# Patient Record
Sex: FEMALE | Race: WHITE | NOT HISPANIC OR LATINO | Employment: OTHER | URBAN - METROPOLITAN AREA
[De-identification: names, ages, dates, MRNs, and addresses within clinical notes are randomized per-mention and may not be internally consistent; named-entity substitution may affect disease eponyms.]

---

## 2022-10-18 ENCOUNTER — APPOINTMENT (OUTPATIENT)
Dept: RADIOLOGY | Facility: CLINIC | Age: 87
End: 2022-10-18
Attending: EMERGENCY MEDICINE
Payer: COMMERCIAL

## 2022-10-18 ENCOUNTER — APPOINTMENT (OUTPATIENT)
Dept: CT IMAGING | Facility: CLINIC | Age: 87
End: 2022-10-18
Attending: EMERGENCY MEDICINE
Payer: COMMERCIAL

## 2022-10-18 ENCOUNTER — HOSPITAL ENCOUNTER (EMERGENCY)
Facility: CLINIC | Age: 87
Discharge: ANOTHER HOSPITAL | End: 2022-10-18
Attending: EMERGENCY MEDICINE | Admitting: EMERGENCY MEDICINE
Payer: COMMERCIAL

## 2022-10-18 VITALS
BODY MASS INDEX: 22.19 KG/M2 | TEMPERATURE: 97.7 F | OXYGEN SATURATION: 94 % | HEIGHT: 60 IN | SYSTOLIC BLOOD PRESSURE: 197 MMHG | RESPIRATION RATE: 22 BRPM | WEIGHT: 113 LBS | HEART RATE: 80 BPM | DIASTOLIC BLOOD PRESSURE: 88 MMHG

## 2022-10-18 DIAGNOSIS — U07.1 INFECTION DUE TO 2019 NOVEL CORONAVIRUS: ICD-10-CM

## 2022-10-18 DIAGNOSIS — I95.1 ORTHOSTATIC SYNCOPE: ICD-10-CM

## 2022-10-18 LAB
ALBUMIN UR-MCNC: NEGATIVE MG/DL
ANION GAP SERPL CALCULATED.3IONS-SCNC: 10 MMOL/L (ref 5–18)
APPEARANCE UR: CLEAR
ATRIAL RATE - MUSE: 61 BPM
BACTERIA #/AREA URNS HPF: ABNORMAL /HPF
BASOPHILS # BLD AUTO: 0 10E3/UL (ref 0–0.2)
BASOPHILS NFR BLD AUTO: 0 %
BILIRUB UR QL STRIP: NEGATIVE
BUN SERPL-MCNC: 15 MG/DL (ref 8–28)
CALCIUM SERPL-MCNC: 9 MG/DL (ref 8.5–10.5)
CHLORIDE BLD-SCNC: 103 MMOL/L (ref 98–107)
CO2 SERPL-SCNC: 26 MMOL/L (ref 22–31)
COLOR UR AUTO: ABNORMAL
CREAT SERPL-MCNC: 0.7 MG/DL (ref 0.6–1.1)
DIASTOLIC BLOOD PRESSURE - MUSE: 91 MMHG
EOSINOPHIL # BLD AUTO: 0.1 10E3/UL (ref 0–0.7)
EOSINOPHIL NFR BLD AUTO: 1 %
ERYTHROCYTE [DISTWIDTH] IN BLOOD BY AUTOMATED COUNT: 13.2 % (ref 10–15)
FLUAV RNA SPEC QL NAA+PROBE: NEGATIVE
FLUBV RNA RESP QL NAA+PROBE: NEGATIVE
GFR SERPL CREATININE-BSD FRML MDRD: 77 ML/MIN/1.73M2
GLUCOSE BLD-MCNC: 129 MG/DL (ref 70–125)
GLUCOSE BLDC GLUCOMTR-MCNC: 153 MG/DL (ref 70–99)
GLUCOSE UR STRIP-MCNC: NEGATIVE MG/DL
HCT VFR BLD AUTO: 45 % (ref 35–47)
HGB BLD-MCNC: 14.6 G/DL (ref 11.7–15.7)
HGB UR QL STRIP: NEGATIVE
HOLD SPECIMEN: NORMAL
HOLD SPECIMEN: NORMAL
IMM GRANULOCYTES # BLD: 0.1 10E3/UL
IMM GRANULOCYTES NFR BLD: 1 %
INTERPRETATION ECG - MUSE: NORMAL
KETONES UR STRIP-MCNC: NEGATIVE MG/DL
LACTATE SERPL-SCNC: 2.5 MMOL/L (ref 0.7–2)
LEUKOCYTE ESTERASE UR QL STRIP: ABNORMAL
LYMPHOCYTES # BLD AUTO: 1.9 10E3/UL (ref 0.8–5.3)
LYMPHOCYTES NFR BLD AUTO: 20 %
MAGNESIUM SERPL-MCNC: 2.1 MG/DL (ref 1.8–2.6)
MCH RBC QN AUTO: 28.6 PG (ref 26.5–33)
MCHC RBC AUTO-ENTMCNC: 32.4 G/DL (ref 31.5–36.5)
MCV RBC AUTO: 88 FL (ref 78–100)
MONOCYTES # BLD AUTO: 1.2 10E3/UL (ref 0–1.3)
MONOCYTES NFR BLD AUTO: 13 %
MUCOUS THREADS #/AREA URNS LPF: PRESENT /LPF
NEUTROPHILS # BLD AUTO: 6.2 10E3/UL (ref 1.6–8.3)
NEUTROPHILS NFR BLD AUTO: 65 %
NITRATE UR QL: NEGATIVE
NRBC # BLD AUTO: 0 10E3/UL
NRBC BLD AUTO-RTO: 0 /100
P AXIS - MUSE: 66 DEGREES
PH UR STRIP: 8 [PH] (ref 5–7)
PLATELET # BLD AUTO: 186 10E3/UL (ref 150–450)
POTASSIUM BLD-SCNC: 4 MMOL/L (ref 3.5–5)
PR INTERVAL - MUSE: 142 MS
QRS DURATION - MUSE: 134 MS
QT - MUSE: 484 MS
QTC - MUSE: 487 MS
R AXIS - MUSE: 22 DEGREES
RBC # BLD AUTO: 5.11 10E6/UL (ref 3.8–5.2)
RBC URINE: 0 /HPF
RSV RNA SPEC NAA+PROBE: NEGATIVE
SARS-COV-2 RNA RESP QL NAA+PROBE: POSITIVE
SODIUM SERPL-SCNC: 139 MMOL/L (ref 136–145)
SP GR UR STRIP: 1.03 (ref 1–1.03)
SYSTOLIC BLOOD PRESSURE - MUSE: 207 MMHG
T AXIS - MUSE: 158 DEGREES
TROPONIN I SERPL-MCNC: 0.02 NG/ML (ref 0–0.29)
UROBILINOGEN UR STRIP-MCNC: <2 MG/DL
VENTRICULAR RATE- MUSE: 61 BPM
WBC # BLD AUTO: 9.4 10E3/UL (ref 4–11)
WBC URINE: 6 /HPF

## 2022-10-18 PROCEDURE — 83605 ASSAY OF LACTIC ACID: CPT | Performed by: EMERGENCY MEDICINE

## 2022-10-18 PROCEDURE — 70450 CT HEAD/BRAIN W/O DYE: CPT

## 2022-10-18 PROCEDURE — 250N000011 HC RX IP 250 OP 636: Performed by: EMERGENCY MEDICINE

## 2022-10-18 PROCEDURE — C9803 HOPD COVID-19 SPEC COLLECT: HCPCS

## 2022-10-18 PROCEDURE — 83735 ASSAY OF MAGNESIUM: CPT | Performed by: EMERGENCY MEDICINE

## 2022-10-18 PROCEDURE — 71275 CT ANGIOGRAPHY CHEST: CPT

## 2022-10-18 PROCEDURE — 85025 COMPLETE CBC W/AUTO DIFF WBC: CPT | Performed by: EMERGENCY MEDICINE

## 2022-10-18 PROCEDURE — 84484 ASSAY OF TROPONIN QUANT: CPT | Performed by: EMERGENCY MEDICINE

## 2022-10-18 PROCEDURE — 99285 EMERGENCY DEPT VISIT HI MDM: CPT | Mod: 25

## 2022-10-18 PROCEDURE — 71045 X-RAY EXAM CHEST 1 VIEW: CPT

## 2022-10-18 PROCEDURE — 87086 URINE CULTURE/COLONY COUNT: CPT | Performed by: EMERGENCY MEDICINE

## 2022-10-18 PROCEDURE — 93005 ELECTROCARDIOGRAM TRACING: CPT | Performed by: EMERGENCY MEDICINE

## 2022-10-18 PROCEDURE — 81001 URINALYSIS AUTO W/SCOPE: CPT | Performed by: EMERGENCY MEDICINE

## 2022-10-18 PROCEDURE — 87637 SARSCOV2&INF A&B&RSV AMP PRB: CPT | Performed by: EMERGENCY MEDICINE

## 2022-10-18 PROCEDURE — 80048 BASIC METABOLIC PNL TOTAL CA: CPT | Performed by: EMERGENCY MEDICINE

## 2022-10-18 PROCEDURE — 36415 COLL VENOUS BLD VENIPUNCTURE: CPT | Performed by: EMERGENCY MEDICINE

## 2022-10-18 PROCEDURE — 258N000003 HC RX IP 258 OP 636: Performed by: EMERGENCY MEDICINE

## 2022-10-18 RX ORDER — DEXAMETHASONE SODIUM PHOSPHATE 10 MG/ML
6 INJECTION, SOLUTION INTRAMUSCULAR; INTRAVENOUS ONCE
Status: DISCONTINUED | OUTPATIENT
Start: 2022-10-18 | End: 2022-10-19 | Stop reason: HOSPADM

## 2022-10-18 RX ORDER — IOPAMIDOL 755 MG/ML
100 INJECTION, SOLUTION INTRAVASCULAR ONCE
Status: COMPLETED | OUTPATIENT
Start: 2022-10-18 | End: 2022-10-18

## 2022-10-18 RX ORDER — NIRMATRELVIR AND RITONAVIR 300-100 MG
3 KIT ORAL 2 TIMES DAILY
Status: ON HOLD | COMMUNITY
Start: 2022-10-17 | End: 2022-10-21

## 2022-10-18 RX ORDER — VIT A/VIT C/VIT E/ZINC/COPPER 4296-226
1 CAPSULE ORAL 2 TIMES DAILY
COMMUNITY

## 2022-10-18 RX ORDER — GUAIFENESIN 600 MG/1
600 TABLET, EXTENDED RELEASE ORAL 2 TIMES DAILY PRN
COMMUNITY

## 2022-10-18 RX ADMIN — IOPAMIDOL 100 ML: 755 INJECTION, SOLUTION INTRAVENOUS at 12:46

## 2022-10-18 RX ADMIN — SODIUM CHLORIDE 1000 ML: 9 INJECTION, SOLUTION INTRAVENOUS at 11:23

## 2022-10-18 ASSESSMENT — ACTIVITIES OF DAILY LIVING (ADL)
ADLS_ACUITY_SCORE: 35

## 2022-10-18 NOTE — ED PROVIDER NOTES
EMERGENCY DEPARTMENT ENCOUNTER      NAME: Jennifer Sorensen  AGE: 89 year old female  YOB: 1922  MRN: [id number]  EVALUATION DATE & TIME: 10/18/2022 10:53 AM    PCP: No primary care provider on file.    ED PROVIDER: Srikanth Carrillo M.D.      Chief Complaint   Patient presents with     Syncope            Cardiac Arrest     Covid Concern       FINAL IMPRESSION:  1. Infection due to 2019 novel coronavirus    2. Orthostatic syncope         ED COURSE & MEDICAL DECISION MAKIN year old female presents to the Emergency Department for evaluation of syncope.  Most of the history is obtained from her daughter.  Apparently has been suffering from respiratory symptoms as well as her daughter who is known to be positive for COVID-19.  Had an episode of passing out at home today and was briefly unresponsive and ashen per her report.  Unclear via conflicting EMS reports if she actually got any cardiopulmonary resuscitation.  By time of arrival here she is appearing stable, breathing spontaneously, able to answer simple questions.  She did have a confirmatory COVID-19 test which was positive here.  We were able to wean her to 2 L of nasal cannula oxygen although she does desaturate when off of oxygen entirely.  Chest CT negative for PE but does show some bibasilar opacities consistent with possible COVID-pneumonia.  Head CT negative.  The remainder of her evaluation shows an EKG with left bundle branch block, negative troponin, overall fairly reassuring metabolic profile.  She does have a mild lactic acidosis, she received some IV fluids here, vital signs remained stable besides mild hypoxemia.  Given her hypoxia and syncopal episode, she will need to be admitted for continued management.  [facility] is a critical capacity with extensive ED boarding, reached out to other [place] facilities and patient was excepted at [facility] where I spoke with the CANDICE Sue.  Updated patient's family  member and they were in agreement with the plan for transfer and admission.      10:53 AM I met with the patient, obtained history, performed an initial exam, and discussed options and plan for diagnostics and treatment here in the ED. PPE worn: N95, eye protection, gloves.  11:15 AM Spoke with the patient's daughter over the phone to obtain further history. Discussed patient's code status.  1:49 PM Spoke again with the patient's daughter. Further discussed code status.    At the conclusion of the encounter I discussed the results of all of the tests and the disposition. The questions were answered. The patient or family acknowledged understanding and was agreeable with the care plan.       MEDICATIONS GIVEN IN THE EMERGENCY:  Medications   0.9% sodium chloride BOLUS (1,000 mLs Intravenous New Bag 10/18/22 1123)   iopamidol (ISOVUE-370) solution 100 mL (100 mLs Intravenous Given 10/18/22 1246)       NEW PRESCRIPTIONS STARTED AT TODAY'S ER VISIT  New Prescriptions    No medications on file     =================================================================    HPI  HPI limited secondary to altered mental status and acuity of condition.  Patient information was obtained from: EMS, patient    Use of : N/A    Jennifer Sorensen is a 89 year old female without a pertinent history on file who presents to this ED by EMS for evaluation after a syncopal episode. Patient is reportedly visiting her daughter from [place]. No history on file. Arrives by EMS from a private residence after having a syncopal episode at home. Patient reportedly received a couple chest compressions by EMS which woke her up. Patient is reportedly Covid positive with unknown date of diagnosis.    Patient is very hard of hearing but states that she feels a little short of breath and has had a little chest pain.    Per patient's daughter over the phone, patient reportedly was unable to sleep last night because she felt like she couldn't  breathe and was constantly coughing. Daughter gave the patient Mucinex and Theraflu. Daughter got patient to sleep in the chair because she couldn't lie flat. This morning patient told her daughter she felt like she was going to pass out in the chair. Daughter states that patient was gasping for air and looked blue this morning. She called 911 and by the time EMS arrived patient was breathing better and her color had returned. She had the patient laying on the floor on her side when EMS arrived. Daughter mentions that both her and the patient received their flu shot yesterday. She reports that she also recently tested positive for Covid. She does not state whether or not patient has tested positive for Covid, though patient does present with a prescription for Paxlovid. She reports that patient has hearing loss and a couple of falls in the last couple of years, but otherwise does not have any significant past medical history.    Of note, daughter reports that patient would like to be FULL CODE at this time.    REVIEW OF SYSTEMS   ROS unable to be obtained secondary to altered mental status and acuity of condition.    PAST MEDICAL HISTORY:  History reviewed. No pertinent past medical history.    PAST SURGICAL HISTORY:  History reviewed. No pertinent surgical history.    CURRENT MEDICATIONS:    No current facility-administered medications for this encounter.     Current Outpatient Medications   Medication     guaiFENesin (MUCINEX) 600 MG 12 hr tablet     Multiple Vitamins-Minerals (PRESERVISION AREDS) CAPS     nirmatrelvir and ritonavir (PAXLOVID, 300/100,) therapy pack     Pseudoephedrine-DM-GG-APAP (DAYQUIL LIQUICAPS OR)       ALLERGIES:  No Known Allergies    FAMILY HISTORY:  History reviewed. No pertinent family history.    SOCIAL HISTORY:        VITALS:  BP (!) 152/70   Pulse 70   Temp 96.8  F (36  C) (Rectal)   Resp 21   Ht 1.524 m (5')   Wt 51.3 kg (113 lb)   SpO2 93%   BMI 22.07 kg/m      PHYSICAL EXAM     Constitutional: Thin elderly female patient, laying in bed, no acute distress  HENT: Normocephalic, Atraumatic. Neck Supple.  Eyes: EOMI, Conjunctiva normal.  Respiratory: Mildly tachypneic, initially on nonrebreather oxygen.  Lung sounds are coarse bilaterally.  No severe respiratory distress  Cardiovascular: Normal heart rate, Regular rhythm. No peripheral edema.  Abdomen: Soft, nontender  Musculoskeletal: Good range of motion in all major joints. No major deformities noted.  Integument: Warm, Dry.  Neurologic: Alert & awake, Normal motor function, Normal sensory function, No focal deficits noted.   Psychiatric: Cooperative. Affect appropriate.     LAB:  All pertinent labs reviewed and interpreted.  Labs Ordered and Resulted from Time of ED Arrival to Time of ED Departure   BASIC METABOLIC PANEL - Abnormal       Result Value    Sodium 139      Potassium 4.0      Chloride 103      Carbon Dioxide (CO2) 26      Anion Gap 10      Urea Nitrogen 15      Creatinine 0.70      Calcium 9.0      Glucose 129 (*)     GFR Estimate 77     LACTIC ACID WHOLE BLOOD - Abnormal    Lactic Acid 2.5 (*)    ROUTINE UA WITH MICROSCOPIC REFLEX TO CULTURE - Abnormal    Color Urine Light Yellow      Appearance Urine Clear      Glucose Urine Negative      Bilirubin Urine Negative      Ketones Urine Negative      Specific Gravity Urine 1.028      Blood Urine Negative      pH Urine 8.0 (*)     Protein Albumin Urine Negative      Urobilinogen Urine <2.0      Nitrite Urine Negative      Leukocyte Esterase Urine 250 Lexii/uL (*)     Bacteria Urine Few (*)     Mucus Urine Present (*)     RBC Urine 0      WBC Urine 6 (*)    GLUCOSE BY METER - Abnormal    GLUCOSE BY METER POCT 153 (*)    INFLUENZA A/B & SARS-COV2 PCR MULTIPLEX - Abnormal    Influenza A PCR Negative      Influenza B PCR Negative      RSV PCR Negative      SARS CoV2 PCR Positive (*)    TROPONIN I - Normal    Troponin I 0.02     MAGNESIUM - Normal    Magnesium 2.1     GLUCOSE MONITOR  NURSING POCT   CBC WITH PLATELETS AND DIFFERENTIAL    WBC Count 9.4      RBC Count 5.11      Hemoglobin 14.6      Hematocrit 45.0      MCV 88      MCH 28.6      MCHC 32.4      RDW 13.2      Platelet Count 186      % Neutrophils 65      % Lymphocytes 20      % Monocytes 13      % Eosinophils 1      % Basophils 0      % Immature Granulocytes 1      NRBCs per 100 WBC 0      Absolute Neutrophils 6.2      Absolute Lymphocytes 1.9      Absolute Monocytes 1.2      Absolute Eosinophils 0.1      Absolute Basophils 0.0      Absolute Immature Granulocytes 0.1      Absolute NRBCs 0.0     URINE CULTURE       RADIOLOGY:  Reviewed all pertinent imaging. Please see official radiology report.  CT Chest Pulmonary Embolism w Contrast   Final Result   IMPRESSION:   1.  No acute pulmonary embolism or secondary signs of right heart strain.      2.  Bibasilar linear and patchy airspace opacities, most likely subsegmental atelectasis or scarring. Superimposed pneumonia also possible.      3.  Severe T11 vertebral body compression fracture, age indeterminate.      4.  Incidental 1.8 cm rim-enhancing suprasternal nodule, possibly an exophytic thyroid nodule or lymph node. Suggest further evaluation with ultrasound.      Head CT w/o contrast   Final Result   IMPRESSION:   1.  No CT evidence for acute intracranial process.   2.  Brain atrophy and presumed chronic microvascular ischemic changes as above.      XR Chest Port 1 View   Final Result   IMPRESSION: Heart size magnified in AP projection with normal vascularity. Mild interstitial prominence with small pleural effusions could represent some degree of fluid overload. Deformity of the posterolateral left sixth and seventh ribs could    represent acute fractures if focal pain in this area. No pneumothorax.          EKG:    Performed at: 11:13    Impression: Sinus rhythm. LBBB.    Rate: 61 bpm  Rhythm: sinus  Axis: 66, 22, 158  IN Interval: 142  QRS Interval: 134  QTc Interval: 487  ST  Changes: N/A  Comparison: No previous EKG available for comparison.    I have independently reviewed and interpreted the EKG(s) documented above.        I, Aime Carlson, am serving as a scribe to document services personally performed by Dr. Srikanth Carrillo, based on my observation and the provider's statements to me. I, Srikanth Carrillo MD attest that Aime Carlson is acting in a scribe capacity, has observed my performance of the services and has documented them in accordance with my direction.    Srikanth Carrillo M.D.  Emergency Medicine  [facility] EMERGENCY ROOM  [facility] [address]  [phone]  Dept: [phone]     Srikanth Carrillo MD  10/18/22 8577

## 2022-10-18 NOTE — ED NOTES
Bed: [facility]ED-01  Expected date: 10/18/22  Expected time:   Means of arrival:   Comments:  [facility]

## 2022-10-18 NOTE — ED TRIAGE NOTES
"Pt arrives via EMS for concerns of syncope/cardiac arrest.  Pt had a syncopal episode at home and was incontinent of stool.  Per EMS pt was with her daughter and had a syncopal episode.  EMS states pt did not have a pulse/ashen when they arrived and got chest compressions.  No medications given.  Pt awake, hard of hearing and complains of pain \"all over.\"  Pt has known COVID and unknown date of onset.  Arrives with paxlovid dated today. Pt awake currently and answering some questions.  She does report she is visiting from [place].      "

## 2022-10-18 NOTE — PHARMACY-ADMISSION MEDICATION HISTORY
Pharmacy Note - Admission Medication History    Pertinent Provider Information: med info from daughter, paxlovid box-- pt was prescribed full dose of paxlovid from [facility] virtual visit.  Should be dose adjusted for renal function, if continued     ______________________________________________________________________    Prior To Admission (PTA) med list completed and updated in EMR.       PTA Med List   Medication Sig Note Last Dose     guaiFENesin (MUCINEX) 600 MG 12 hr tablet Take 600 mg by mouth 2 times daily as needed for congestion  Past Week     Multiple Vitamins-Minerals (PRESERVISION AREDS) CAPS Take 1 capsule by mouth 2 times daily  Past Week     nirmatrelvir and ritonavir (PAXLOVID, 300/100,) therapy pack Take 3 tablets by mouth 2 times daily 10/18/2022: Per Rx package 3 tabs BID-- however renal dose should be nirmatrelvir 150 mg and ritonavir 100 mg BID x5 days 10/18/2022 at am  3 tab;  has with     Pseudoephedrine-DM-GG-APAP (DAYQUIL LIQUICAPS OR) Take 1 capsule by mouth 2 times daily as needed  Past Week       Information source(s): Family member  Method of interview communication: phone    Summary of Changes to PTA Med List  New: meds new to this encounter  Discontinued: none  Changed: none    Patient was asked about OTC/herbal products specifically.  PTA med list reflects this.    In the past week, patient estimated taking medication this percent of the time:  greater than 90%.    Allergies were reviewed, assessed, and updated with the patient.      Medications available for use during hospital stay: paxlovid.     The information provided in this note is only as accurate as the sources available at the time of the update(s).    Thank you for the opportunity to participate in the care of this patient.    Nessa Alford [facility]  10/18/2022 3:06 PM

## 2022-10-19 ENCOUNTER — HOSPITAL ENCOUNTER (INPATIENT)
Facility: CLINIC | Age: 87
LOS: 2 days | Discharge: HOME | DRG: 177 | End: 2022-10-21
Attending: INTERNAL MEDICINE | Admitting: INTERNAL MEDICINE
Payer: COMMERCIAL

## 2022-10-19 DIAGNOSIS — J12.82 PNEUMONIA DUE TO COVID-19 VIRUS: Primary | ICD-10-CM

## 2022-10-19 DIAGNOSIS — U07.1 PNEUMONIA DUE TO COVID-19 VIRUS: Primary | ICD-10-CM

## 2022-10-19 DIAGNOSIS — I10 HYPERTENSION, UNSPECIFIED TYPE: ICD-10-CM

## 2022-10-19 LAB
ALBUMIN SERPL BCG-MCNC: 3.7 G/DL (ref 3.5–5.2)
ALP SERPL-CCNC: 100 U/L (ref 35–104)
ALT SERPL W P-5'-P-CCNC: 35 U/L (ref 10–35)
ANION GAP SERPL CALCULATED.3IONS-SCNC: 12 MMOL/L (ref 7–15)
AST SERPL W P-5'-P-CCNC: 45 U/L (ref 10–35)
BASOPHILS # BLD AUTO: 0 10E3/UL (ref 0–0.2)
BASOPHILS NFR BLD AUTO: 0 %
BILIRUB SERPL-MCNC: 0.3 MG/DL
BUN SERPL-MCNC: 9.7 MG/DL (ref 8–23)
CALCIUM SERPL-MCNC: 9 MG/DL (ref 8.2–9.6)
CHLORIDE SERPL-SCNC: 99 MMOL/L (ref 98–107)
CREAT SERPL-MCNC: 0.51 MG/DL (ref 0.51–0.95)
CRP SERPL-MCNC: 29.19 MG/L
D DIMER PPP FEU-MCNC: 1.42 UG/ML FEU (ref 0–0.5)
DEPRECATED HCO3 PLAS-SCNC: 26 MMOL/L (ref 22–29)
EOSINOPHIL # BLD AUTO: 0 10E3/UL (ref 0–0.7)
EOSINOPHIL NFR BLD AUTO: 0 %
ERYTHROCYTE [DISTWIDTH] IN BLOOD BY AUTOMATED COUNT: 13.2 % (ref 10–15)
GFR SERPL CREATININE-BSD FRML MDRD: 83 ML/MIN/1.73M2
GLUCOSE SERPL-MCNC: 141 MG/DL (ref 70–99)
HCT VFR BLD AUTO: 44.3 % (ref 35–47)
HGB BLD-MCNC: 14.4 G/DL (ref 11.7–15.7)
HOLD SPECIMEN: NORMAL
IMM GRANULOCYTES # BLD: 0 10E3/UL
IMM GRANULOCYTES NFR BLD: 0 %
INR PPP: 1.07 (ref 0.85–1.15)
LYMPHOCYTES # BLD AUTO: 0.5 10E3/UL (ref 0.8–5.3)
LYMPHOCYTES NFR BLD AUTO: 7 %
MCH RBC QN AUTO: 28.3 PG (ref 26.5–33)
MCHC RBC AUTO-ENTMCNC: 32.5 G/DL (ref 31.5–36.5)
MCV RBC AUTO: 87 FL (ref 78–100)
MONOCYTES # BLD AUTO: 0.6 10E3/UL (ref 0–1.3)
MONOCYTES NFR BLD AUTO: 9 %
NEUTROPHILS # BLD AUTO: 5.5 10E3/UL (ref 1.6–8.3)
NEUTROPHILS NFR BLD AUTO: 84 %
NRBC # BLD AUTO: 0 10E3/UL
NRBC BLD AUTO-RTO: 0 /100
PLATELET # BLD AUTO: 141 10E3/UL (ref 150–450)
POTASSIUM SERPL-SCNC: 3.9 MMOL/L (ref 3.4–5.3)
PROCALCITONIN SERPL IA-MCNC: 0.05 NG/ML
PROT SERPL-MCNC: 7 G/DL (ref 6.4–8.3)
RBC # BLD AUTO: 5.08 10E6/UL (ref 3.8–5.2)
SODIUM SERPL-SCNC: 137 MMOL/L (ref 136–145)
TROPONIN T SERPL HS-MCNC: 35 NG/L
TROPONIN T SERPL HS-MCNC: 41 NG/L
WBC # BLD AUTO: 6.6 10E3/UL (ref 4–11)

## 2022-10-19 PROCEDURE — 84145 PROCALCITONIN (PCT): CPT | Performed by: HOSPITALIST

## 2022-10-19 PROCEDURE — 36415 COLL VENOUS BLD VENIPUNCTURE: CPT | Performed by: HOSPITALIST

## 2022-10-19 PROCEDURE — 36415 COLL VENOUS BLD VENIPUNCTURE: CPT | Performed by: INTERNAL MEDICINE

## 2022-10-19 PROCEDURE — 85379 FIBRIN DEGRADATION QUANT: CPT | Performed by: INTERNAL MEDICINE

## 2022-10-19 PROCEDURE — 84484 ASSAY OF TROPONIN QUANT: CPT | Performed by: INTERNAL MEDICINE

## 2022-10-19 PROCEDURE — 250N000011 HC RX IP 250 OP 636: Performed by: INTERNAL MEDICINE

## 2022-10-19 PROCEDURE — 85610 PROTHROMBIN TIME: CPT | Performed by: INTERNAL MEDICINE

## 2022-10-19 PROCEDURE — XW033E5 INTRODUCTION OF REMDESIVIR ANTI-INFECTIVE INTO PERIPHERAL VEIN, PERCUTANEOUS APPROACH, NEW TECHNOLOGY GROUP 5: ICD-10-PCS | Performed by: INTERNAL MEDICINE

## 2022-10-19 PROCEDURE — 120N000001 HC R&B MED SURG/OB

## 2022-10-19 PROCEDURE — 99223 1ST HOSP IP/OBS HIGH 75: CPT | Mod: AI | Performed by: INTERNAL MEDICINE

## 2022-10-19 PROCEDURE — 258N000003 HC RX IP 258 OP 636: Performed by: INTERNAL MEDICINE

## 2022-10-19 PROCEDURE — 80053 COMPREHEN METABOLIC PANEL: CPT | Performed by: INTERNAL MEDICINE

## 2022-10-19 PROCEDURE — 86140 C-REACTIVE PROTEIN: CPT | Performed by: HOSPITALIST

## 2022-10-19 PROCEDURE — 85025 COMPLETE CBC W/AUTO DIFF WBC: CPT | Performed by: INTERNAL MEDICINE

## 2022-10-19 RX ORDER — CEFTRIAXONE 1 G/1
1 INJECTION, POWDER, FOR SOLUTION INTRAMUSCULAR; INTRAVENOUS EVERY 24 HOURS
Status: DISCONTINUED | OUTPATIENT
Start: 2022-10-19 | End: 2022-10-19

## 2022-10-19 RX ORDER — ENOXAPARIN SODIUM 100 MG/ML
40 INJECTION SUBCUTANEOUS EVERY 24 HOURS
Status: DISCONTINUED | OUTPATIENT
Start: 2022-10-19 | End: 2022-10-21 | Stop reason: HOSPADM

## 2022-10-19 RX ORDER — ACETAMINOPHEN 325 MG/1
325 TABLET ORAL EVERY 4 HOURS PRN
Status: DISCONTINUED | OUTPATIENT
Start: 2022-10-19 | End: 2022-10-21 | Stop reason: HOSPADM

## 2022-10-19 RX ORDER — IPRATROPIUM BROMIDE AND ALBUTEROL SULFATE 2.5; .5 MG/3ML; MG/3ML
3 SOLUTION RESPIRATORY (INHALATION) EVERY 6 HOURS PRN
Status: DISCONTINUED | OUTPATIENT
Start: 2022-10-19 | End: 2022-10-21 | Stop reason: HOSPADM

## 2022-10-19 RX ORDER — LIDOCAINE 40 MG/G
CREAM TOPICAL
Status: DISCONTINUED | OUTPATIENT
Start: 2022-10-19 | End: 2022-10-21 | Stop reason: HOSPADM

## 2022-10-19 RX ORDER — ONDANSETRON 2 MG/ML
4 INJECTION INTRAMUSCULAR; INTRAVENOUS EVERY 6 HOURS PRN
Status: DISCONTINUED | OUTPATIENT
Start: 2022-10-19 | End: 2022-10-21 | Stop reason: HOSPADM

## 2022-10-19 RX ORDER — HYDRALAZINE HYDROCHLORIDE 20 MG/ML
10 INJECTION INTRAMUSCULAR; INTRAVENOUS EVERY 6 HOURS PRN
Status: DISCONTINUED | OUTPATIENT
Start: 2022-10-19 | End: 2022-10-21 | Stop reason: HOSPADM

## 2022-10-19 RX ADMIN — ENOXAPARIN SODIUM 40 MG: 100 INJECTION SUBCUTANEOUS at 05:17

## 2022-10-19 RX ADMIN — DEXAMETHASONE 6 MG: 2 TABLET ORAL at 02:13

## 2022-10-19 RX ADMIN — CEFTRIAXONE 1 G: 1 INJECTION, POWDER, FOR SOLUTION INTRAMUSCULAR; INTRAVENOUS at 05:11

## 2022-10-19 RX ADMIN — REMDESIVIR 200 MG: 100 INJECTION, POWDER, LYOPHILIZED, FOR SOLUTION INTRAVENOUS at 08:03

## 2022-10-19 RX ADMIN — AZITHROMYCIN 500 MG: 500 INJECTION, POWDER, LYOPHILIZED, FOR SOLUTION INTRAVENOUS at 05:53

## 2022-10-19 RX ADMIN — SODIUM CHLORIDE 50 ML: 9 INJECTION, SOLUTION INTRAVENOUS at 08:59

## 2022-10-19 ASSESSMENT — ACTIVITIES OF DAILY LIVING (ADL)
ADLS_ACUITY_SCORE: 27
ADLS_ACUITY_SCORE: 35
ADLS_ACUITY_SCORE: 27

## 2022-10-19 NOTE — H&P
[facility]    History and Physical - Hospitalist Service       Date of Admission:  10/19/2022    Chief Complaint : Syncope.    History of Present Illness   Ms. Sorensen is a 89 year old  female with no significant past medical history who was transferred to [facility] from [facility] ER following a syncopal episode and cardiac arrest. Please note that the patient is hearing impaired and a poor historian and the majority of the history was obtained from the chart. She lives in [place] and she is in town visiting her daughter. She had previously tested positive for COVID-19 and she has a prescription for paxlovid. According to the records she had a syncopal episode at her daughter's home and she was incontinent of bowel. Her daughter called 911 and she did not have a pulse on EMS arrival. Chest compressions were initiated with ROSC and she was transported to [facility] ER for further evaluation. On arrival to the ER she was hypertensive with a blood pressure of 228/102 and hypoxic with O2 saturations in the 80s and her initial labwork was remarkable for a troponin of 41, glucose of 141, d-dimer of 1.42, and glucose of 141. Her CXR showed mild interstitial prominence with small pleural effusions and CT of the head revealed no acute intracranial abnormality. CTA of the chest demonstrated no acute pulmonary embolism or secondary signs of right heart strain, bibasilar linear and patchy airspace opacities, a severe T11 vertebral body compression fracture, and an incidental 1.8 cm rim-enhancing suprasternal nodule. She complains of a dry cough and intermittent chest pain and she was placed on 4 liters nasal cannula in the ER. She is otherwise in stable condition and she has no other complaints today.    Review of Systems    General: negative for fever, chills, sweats, weakness  Eyes: negative for blurred vision, loss of vision  Ear Nose and Throat: negative for pharyngitis, speech  or swallowing difficulties  Respiratory:  positive for cough  Cardiology:  positive for chest pain  Gastrointestinal: negative for abdominal pain, nausea, vomiting, diarrhea, constipation, hematemesis, melena or hematochezia  Genitourinary: negative for frequency, urgency, dysuria, hematuria   Neurological: negative for focal weakness, paresthesia    Past Medical History    I have reviewed this patient's medical history and updated it with pertinent information if needed.   Past Medical History:   Diagnosis Date     Hearing impairment        Past Surgical History   I have reviewed this patient's surgical history and updated it with pertinent information if needed.  Past Surgical History:   Procedure Laterality Date     TONSILLECTOMY         Social History : She denies any tobacco, alcohol, or illicit drug use.       Family History         Prior to Admission Medications   Prior to Admission Medications   Prescriptions Last Dose Informant Patient Reported? Taking?   Multiple Vitamins-Minerals (PRESERVISION AREDS) CAPS   Yes No   Sig: Take 1 capsule by mouth 2 times daily   Pseudoephedrine-DM-GG-APAP (DAYQUIL LIQUICAPS OR)   Yes No   Sig: Take 1 capsule by mouth 2 times daily as needed   guaiFENesin (MUCINEX) 600 MG 12 hr tablet   Yes No   Sig: Take 600 mg by mouth 2 times daily as needed for congestion   nirmatrelvir and ritonavir (PAXLOVID, 300/100,) therapy pack   Yes No   Sig: Take 3 tablets by mouth 2 times daily      Facility-Administered Medications: None     Allergies   No Known Allergies    Physical Exam   Vital Signs: Temp: 98.1  F (36.7  C) Temp src: Oral BP: (!) 185/88 Pulse: 71   Resp: 20 SpO2: 90 % O2 Device: Nasal cannula Oxygen Delivery: 4 LPM  Weight: 109 lbs 12.63 oz    General: Elderly hearing impaired WF in NAD.  HEENT: NCAT.  Neck: No LAD, TMG, or JVD.  CVS: RRR.  Respiratory: Coarse crackles bilaterally.  Abdomen: Soft, NT, ND, BS+.  Extremities: No c/c/e.   Neuro: Alert and oriented to name, ,  month, and year.  Psych: Pleasant, appropriate.  Skin: No skin tear, rash, or ulcer.    Data     Recent Labs   Lab 10/19/22  0210 10/18/22  1206 10/18/22  1105 10/18/22  1103   WBC 6.6  --  9.4  --    HGB 14.4  --  14.6  --    MCV 87  --  88  --    *  --  186  --    INR 1.07  --   --   --     139  --   --    POTASSIUM 3.9 4.0  --   --    CHLORIDE 99 103  --   --    CO2 26 26  --   --    BUN 9.7 15  --   --    CR 0.51 0.70  --   --    ANIONGAP 12 10  --   --    EDILBERTO 9.0 9.0  --   --    * 129*  --  153*   ALBUMIN 3.7  --   --   --    PROTTOTAL 7.0  --   --   --    BILITOTAL 0.3  --   --   --    ALKPHOS 100  --   --   --    ALT 35  --   --   --    AST 45*  --   --   --          Recent Results (from the past 24 hour(s))   XR Chest Port 1 View    Narrative    EXAM: XR CHEST PORT 1 VIEW  LOCATION: [facility]  DATE/TIME: 10/18/2022 11:28 AM    INDICATION: Syncope, COVID 19  COMPARISON: None.      Impression    IMPRESSION: Heart size magnified in AP projection with normal vascularity. Mild interstitial prominence with small pleural effusions could represent some degree of fluid overload. Deformity of the posterolateral left sixth and seventh ribs could   represent acute fractures if focal pain in this area. No pneumothorax.   Head CT w/o contrast    Narrative    EXAM: CT HEAD W/O CONTRAST  LOCATION: [facility]  DATE/TIME: 10/18/2022 12:52 PM    INDICATION: Syncope, AMS  COMPARISON: None.  TECHNIQUE: Routine CT Head without IV contrast. Multiplanar reformats. Dose reduction techniques were used.    FINDINGS:  INTRACRANIAL CONTENTS: No intracranial hemorrhage, extraaxial collection, or mass effect.  No CT evidence of acute infarct. Moderate presumed chronic small vessel ischemic changes. Mild to moderate generalized volume loss. No hydrocephalus. There is   intracranial atherosclerosis.     VISUALIZED ORBITS/SINUSES/MASTOIDS: Prior bilateral cataract  surgery. Visualized portions of the orbits are otherwise unremarkable. No paranasal sinus mucosal disease. No middle ear or mastoid effusion.    BONES/SOFT TISSUES: No acute abnormality.      Impression    IMPRESSION:  1.  No CT evidence for acute intracranial process.  2.  Brain atrophy and presumed chronic microvascular ischemic changes as above.   CT Chest Pulmonary Embolism w Contrast    Narrative    EXAM: CT CHEST PULMONARY EMBOLISM W CONTRAST  LOCATION: [facility]  DATE/TIME: 10/18/2022 12:53 PM    INDICATION: Presumed COVID, Syncope  COMPARISON: None.  TECHNIQUE: CT chest pulmonary angiogram during arterial phase injection of IV contrast. Multiplanar reformats and MIP reconstructions were performed. Dose reduction techniques were used.   CONTRAST: ISOVUE 370 100mL    FINDINGS:  ANGIOGRAM CHEST: Pulmonary arteries are normal caliber and negative for pulmonary emboli. Thoracic aorta is negative for dissection. No CT evidence of right heart strain.    LUNGS AND PLEURA: Bibasilar linear and patchy airspace opacities. No pleural effusion or pneumothorax.    MEDIASTINUM/AXILLAE: No lymphadenopathy. Normal esophagus. No significant pericardial effusion. 1.8 x 1.2 cm rim-enhancing suprasternal nodule, just below the thyroid (6/55)    CORONARY ARTERY CALCIFICATION: Mild.    UPPER ABDOMEN: Atherosclerotic calcification of the upper abdominal aorta.    MUSCULOSKELETAL: Diffuse osteopenia, multilevel degenerative changes in the spine, and severe compression deformity of T11.      Impression    IMPRESSION:  1.  No acute pulmonary embolism or secondary signs of right heart strain.    2.  Bibasilar linear and patchy airspace opacities, most likely subsegmental atelectasis or scarring. Superimposed pneumonia also possible.    3.  Severe T11 vertebral body compression fracture, age indeterminate.    4.  Incidental 1.8 cm rim-enhancing suprasternal nodule, possibly an exophytic thyroid nodule or lymph  node. Suggest further evaluation with ultrasound.      Assessment/Plan   1. Pneumonia due to COVID-19  -Continue droplet isolation, IV remdesivir, and IV rocephin and azithromycin.    2. Acute respiratory failure with hypoxia  -Continue supplemental oxygen and decadron.    3. Cardiac arrest  -Continue telemetry monitoring.    4. Elevated troponin  -Continue telemetry monitoring. A repeat troponin will be ordered now.    5. Hypertensive urgency  -Continue IV hydralazine as needed.    6. Code status  -DNR.    7. Prophylaxis  -Lovenox for DVT prophylaxis.    8. Disposition  -Anticipate discharge home in the next several days once her respiratory status has improved.     ANDERSON MAO MD  [facility]  Securely message with the Vocera Web Console (learn more here)  Text page via [facility] Paging/Directory      Visit/Communication Style   Virtual (Video) communication was used to evaluate Jennifer.  Jennifer consented to the use of video communication: yes  Video START time: 2:45 am, 10/19/2022  Video STOP time: 3 am, 10/19/2022   Patient's location: [facility]   Provider's location during the visit: [place].  Her exam was performed with the assistance of Jessica Azevedo RN.

## 2022-10-19 NOTE — PROGRESS NOTES
[facility] ADMISSION NOTE    Patient admitted to room 2211 at approximately 0010 via cart from [facility] ED. Patient was accompanied by other :EMS personnell.     Verbal SBAR report received from Alden HIGGINS RN prior to patient arrival.     Patient trasferred to bed via lift sheet & 4 staff. Patient alert and oriented X 1. The patient is not having any pain.  . Admission vital signs: Blood pressure (!) 162/77, pulse 72, temperature 98.5  F (36.9  C), temperature source Oral, resp. rate 20, weight 49.8 kg (109 lb 12.6 oz), SpO2 94 %. Patient was oriented to plan of care, call light, bed controls, tv, telephone, bathroom and visiting hours.     Risk Assessment    The following safety risks were identified during admission: fall. Yellow risk band applied: YES.     Skin Initial Assessment    This writer admitted this patient and completed a full skin assessment and Rolando score in the Adult PCS flowsheet. Appropriate interventions initiated as needed.     Secondary skin check completed by Marizol HERNANDEZ RN.         Education    Patient has a [place] to Observation order: No  Observation education completed and documented: N/A      Jessica Azevedo, RN

## 2022-10-19 NOTE — PROGRESS NOTES
Care Management Note:    CM received referral to assist with discharge planning.     As pt is Covid+ and very [place], so placed call to pt's daughter, Alyssia Looney, [phone].  SW left message with an introduction of self & title and requested a return call.    When Alyssia returns call, SW will complete full pt  Assessment and create safe discharge plan of care.    Caroline Mead, [facility]  Care Transitions   Tele: [phone]

## 2022-10-19 NOTE — PROGRESS NOTES
CLINICAL NUTRITION SERVICES - ASSESSMENT NOTE     Nutrition Prescription    RECOMMENDATIONS FOR MDs/PROVIDERS TO ORDER:  None at this time    Malnutrition Status:    Unable to determine d/t covid-19    Recommendations already ordered by Registered Dietitian (RD):  Please send ensure enlive BID at 10 am and 2 pm snack time    Future/Additional Recommendations:  Monitor patient isolation status to complete NFPA as able  Monitor patient tolerance to supplements  Assess patient intakes, weight trends, labs, and GI/BM     REASON FOR ASSESSMENT  Jennifer Sorensen is a/an 89 year old female assessed by the dietitian for Admission Nutrition Risk Screen for positive    NUTRITION HISTORY  Patient is 89 yr old female with no significant past medical hx. Per MD note patient is poor on hearing and poor historian. Patient [place] in [place] but was in town visiting daughter. Patient admitted today; positive for COVID 19. Per MST criteria patient did not note any decreased appetite but was unsure regarding weight loss. Limited recent weight data in patient chart; unable to assess for weight loss.  D/t patient being covid positive have a noted hearing impairment and poor historian nutritional interview deferred at this time; will follow up as able to determine nutrition hx and NFPA. Patient noted to have increased needs related to acute illness; trial of supplementation ordered. RD to monitor and assess patient intakes. Patient is noted to have NKFA.     CURRENT NUTRITION ORDERS  Diet: Orders Placed This Encounter      Combination Diet Regular Diet Adult      Intake/Tolerance: No recorded intakes in patient chart.    LABS  Labs reviewed    MEDICATIONS  Medications reviewed  Scheduled: Remdesivir, Zithromax, Rocephin  Continuous: None  PRN: None Pertinent    ANTHROPOMETRICS  Height: 0 cm (Data Unavailable)  Most Recent Weight: 49.8 kg (109 lb 12.6 oz)    IBW: 45.5 kg  BMI: Normal BMI  Weight History:   Wt Readings from Last 15  Encounters:   10/19/22 49.8 kg (109 lb 12.6 oz)   10/18/22 51.3 kg (113 lb)   limited weight hx in patient chart; weight from yesterday 10/18 appears to be verbal weight; unable to assess.    Dosing Weight: 49.8 kg-actual BW used    ASSESSED NUTRITION NEEDS  Estimated Energy Needs: 1,245-1,494 kcals/day (25 - 30 kcals/kg)  Justification: Maintenance  Estimated Protein Needs: 59-75 grams protein/day (1.2 - 1.5 grams of pro/kg)  Justification: Increased needs  Estimated Fluid Needs: 1,245-1,494 mL/day (1 mL/kcal)   Justification: Per provider pending fluid status    PHYSICAL FINDINGS  See malnutrition section below.    MALNUTRITION  % Intake: No decreased intake noted  % Weight Loss: Unable to assess  Subcutaneous Fat Loss: Unable to assess  Muscle Loss: Unable to assess  Fluid Accumulation/Edema: None noted  Malnutrition Diagnosis: Unable to determine due to lack of NFPA    NUTRITION DIAGNOSIS  Predicted inadequate protein intakes related to increased needs as evidenced by Covid-19 status and age.     INTERVENTIONS  Implementation  Collaboration with other providers-IDT rounds     Goals  Patient to consume % of nutritionally adequate meal trays TID, or the equivalent with supplements/snacks.     Monitoring/Evaluation  Progress toward goals will be monitored and evaluated per protocol.    Ysabel Uriarte RDN, ELIANE  Clinical Dietitian  Office: [phone]  Weekend pager: [phone]

## 2022-10-19 NOTE — PROGRESS NOTES
Pt has been very sleepy since just before lunch. Was awake for breakfast and was alert and orient. Ate fair amount at breakfast. Had a noted bradycardia when she was asleep, otherwise pulse has been in the mid 50s most of shift. Update Son troy and daughter Alyssia. They r hoping she can discharge back to them tomorrow. Arriving after midnight is likely cause of drowsiness. Daughter states that pt does nap during the day. Oxygen has been titrated down to 1.5 liter and she remain satting in low 90s.. Has no other new concerns other than her desire to go home.

## 2022-10-19 NOTE — PROGRESS NOTES
Patient arrived direct admit from [facility] with EMT present and brief report obtained from them. Patient assisted/lift into bed with 4 assist. Pure Wick placement done, brief on. Skin inspected with this writer and assigned RN. Bed alarm armed. Bed in low position. Belongings bag x 2 in room. Paperwork brought to nurses station. Fall precautions. RN awaiting orders.

## 2022-10-19 NOTE — PROGRESS NOTES
\[facility]\"" HEALTH SERVICES  [facility] - Med/Surg    Referral Source: Patient or family request on admission    - Because Jennifer has a positive COVID infection I won't do an in-person visit.  Normally I try to provide a phone visit but she is also hearing impaired.  I will check back tomorrow to see if there are other options for a visit or if family is present.     Plan:  will remain available for any ongoing support needs during LOS.    Yoav Samson M.A., [facility]  Lead   [facility]  Office: [phone]

## 2022-10-20 LAB
ANION GAP SERPL CALCULATED.3IONS-SCNC: 13 MMOL/L (ref 7–15)
BACTERIA UR CULT: NORMAL
BUN SERPL-MCNC: 22.3 MG/DL (ref 8–23)
CALCIUM SERPL-MCNC: 8.9 MG/DL (ref 8.2–9.6)
CHLORIDE SERPL-SCNC: 101 MMOL/L (ref 98–107)
CREAT SERPL-MCNC: 0.57 MG/DL (ref 0.51–0.95)
CRP SERPL-MCNC: 18.28 MG/L
D DIMER PPP FEU-MCNC: 1.06 UG/ML FEU (ref 0–0.5)
DEPRECATED HCO3 PLAS-SCNC: 25 MMOL/L (ref 22–29)
ERYTHROCYTE [DISTWIDTH] IN BLOOD BY AUTOMATED COUNT: 13.2 % (ref 10–15)
GFR SERPL CREATININE-BSD FRML MDRD: 81 ML/MIN/1.73M2
GLUCOSE SERPL-MCNC: 134 MG/DL (ref 70–99)
HCT VFR BLD AUTO: 43.8 % (ref 35–47)
HGB BLD-MCNC: 14.5 G/DL (ref 11.7–15.7)
MCH RBC QN AUTO: 28.5 PG (ref 26.5–33)
MCHC RBC AUTO-ENTMCNC: 33.1 G/DL (ref 31.5–36.5)
MCV RBC AUTO: 86 FL (ref 78–100)
PLATELET # BLD AUTO: 164 10E3/UL (ref 150–450)
POTASSIUM SERPL-SCNC: 3.5 MMOL/L (ref 3.4–5.3)
RBC # BLD AUTO: 5.09 10E6/UL (ref 3.8–5.2)
SODIUM SERPL-SCNC: 139 MMOL/L (ref 136–145)
WBC # BLD AUTO: 9.4 10E3/UL (ref 4–11)

## 2022-10-20 PROCEDURE — 85379 FIBRIN DEGRADATION QUANT: CPT | Performed by: INTERNAL MEDICINE

## 2022-10-20 PROCEDURE — 86140 C-REACTIVE PROTEIN: CPT | Performed by: INTERNAL MEDICINE

## 2022-10-20 PROCEDURE — 250N000011 HC RX IP 250 OP 636: Performed by: INTERNAL MEDICINE

## 2022-10-20 PROCEDURE — 99233 SBSQ HOSP IP/OBS HIGH 50: CPT | Performed by: HOSPITALIST

## 2022-10-20 PROCEDURE — 120N000001 HC R&B MED SURG/OB

## 2022-10-20 PROCEDURE — 258N000003 HC RX IP 258 OP 636: Performed by: INTERNAL MEDICINE

## 2022-10-20 PROCEDURE — 85027 COMPLETE CBC AUTOMATED: CPT | Performed by: INTERNAL MEDICINE

## 2022-10-20 PROCEDURE — 80048 BASIC METABOLIC PNL TOTAL CA: CPT | Performed by: INTERNAL MEDICINE

## 2022-10-20 PROCEDURE — 250N000013 HC RX MED GY IP 250 OP 250 PS 637: Performed by: INTERNAL MEDICINE

## 2022-10-20 PROCEDURE — 250N000013 HC RX MED GY IP 250 OP 250 PS 637: Performed by: HOSPITALIST

## 2022-10-20 PROCEDURE — 36415 COLL VENOUS BLD VENIPUNCTURE: CPT | Performed by: INTERNAL MEDICINE

## 2022-10-20 RX ORDER — METOPROLOL TARTRATE 25 MG/1
25 TABLET, FILM COATED ORAL 2 TIMES DAILY
Status: DISCONTINUED | OUTPATIENT
Start: 2022-10-20 | End: 2022-10-21 | Stop reason: HOSPADM

## 2022-10-20 RX ADMIN — SODIUM CHLORIDE 50 ML: 9 INJECTION, SOLUTION INTRAVENOUS at 06:23

## 2022-10-20 RX ADMIN — ACETAMINOPHEN 325 MG: 325 TABLET, FILM COATED ORAL at 17:36

## 2022-10-20 RX ADMIN — ENOXAPARIN SODIUM 40 MG: 100 INJECTION SUBCUTANEOUS at 04:50

## 2022-10-20 RX ADMIN — REMDESIVIR 100 MG: 100 INJECTION, POWDER, LYOPHILIZED, FOR SOLUTION INTRAVENOUS at 06:22

## 2022-10-20 RX ADMIN — DEXAMETHASONE 6 MG: 2 TABLET ORAL at 01:01

## 2022-10-20 RX ADMIN — AZITHROMYCIN 500 MG: 500 INJECTION, POWDER, LYOPHILIZED, FOR SOLUTION INTRAVENOUS at 04:50

## 2022-10-20 RX ADMIN — METOPROLOL TARTRATE 25 MG: 25 TABLET, FILM COATED ORAL at 19:32

## 2022-10-20 ASSESSMENT — ACTIVITIES OF DAILY LIVING (ADL)
ADLS_ACUITY_SCORE: 35
ADLS_ACUITY_SCORE: 36
ADLS_ACUITY_SCORE: 35
ADLS_ACUITY_SCORE: 27
ADLS_ACUITY_SCORE: 36
ADLS_ACUITY_SCORE: 27
ADLS_ACUITY_SCORE: 35
ADLS_ACUITY_SCORE: 27

## 2022-10-20 NOTE — PLAN OF CARE
Goal Outcome Evaluation:      Neuro: A&O x3. Forgetful.   Cardiac:   WNL. On tele  Respiratory: Diminished.   GI/: WNL. Purewick in place.  Diet/appetite: Regular  Activity: Has not gotten out of bed  Pain: Denies  Skin: Iain BLE's  LDA's:  IV saline locked     Other: Very hard of hearing. Slept most of day shift.      Plan: Pt's family would like the pt to be discharged tomorrow.

## 2022-10-20 NOTE — PLAN OF CARE
Pt denies discomfort. Has been cooperative w/ cares. Is very [place] even w/ bilat hearing aides in place. Continuous oxygen sat monitoring 90-94% on 1.5L/nc. Oxymeter place on toe as pt has been fidgeting w/ all lines.

## 2022-10-20 NOTE — PLAN OF CARE
Patient on 1 liter nc, oxygen saturation 89 to 92 %. Diminished breath sounds. Occasional non productive cough. Complains of back discomfort. Alert and oriented.

## 2022-10-20 NOTE — PROGRESS NOTES
[facility]    Medicine Progress Note - Hospitalist Service    Date of Admission:  10/19/2022    Assessment & Plan   Ms. Sorensen is a 89 year old  female with no significant past medical history who was transferred to [facility] from [facility] following a syncopal episode and cardiac arrest. Please note that the patient is hearing impaired and a poor historian and the majority of the history was obtained from the chart. She lives in [place] and she is in town visiting her daughter. She had previously tested positive for COVID-19 and she has a prescription for paxlovid. According to the records she had a syncopal episode at her daughter's home and she was incontinent of bowel. Her daughter called 911 and she did not have a pulse on EMS arrival. Chest compressions were initiated with ROSC and she was transported to [facility] ER for further evaluation. On arrival to the ER she was hypertensive with a blood pressure of 228/102 and hypoxic with O2 saturations in the 80s and her initial labwork was remarkable for a troponin of 41, glucose of 141, d-dimer of 1.42, and glucose of 141. Her CXR showed mild interstitial prominence with small pleural effusions and CT of the head revealed no acute intracranial abnormality. CTA of the chest demonstrated no acute pulmonary embolism or secondary signs of right heart strain, bibasilar linear and patchy airspace opacities, a severe T11 vertebral body compression fracture, and an incidental 1.8 cm rim-enhancing suprasternal nodule. She complains of a dry cough and intermittent chest pain and she was placed on 4 liters nasal cannula in the ER, 10/20/2022 on 1 L NC O2. She is otherwise in stable condition and she has no other complaints today.         Diet: Combination Diet Regular Diet Adult  Snacks/Supplements Adult: Ensure Enlive; Between Meals    DVT Prophylaxis: Enoxaparin (Lovenox) SQ  Reddy Catheter: Not present  Central Lines:  None  Cardiac Monitoring: ACTIVE order. Indication: AMI (NSTEMI/ STEMI) (48 hours)  Code Status: No CPR- Do NOT Intubate      Disposition Plan     Expected Discharge Date: 10/21/2022                The patient's care was discussed with the Bedside Nurse, Patient and Patient's Family.    Beti Hercules MD  Hospitalist Service  [facility]  Securely message with the Vocera Web Console (learn more here)  Text page via [employer] Paging/Directory         Clinically Significant Risk Factors                             _____________________________________________________________________    Interval History   Patient is generally afebrile and stable and improved.  She is on 1 L of nasal cannula oxygen which shows improvement compared to yesterday.  Family like to take her home instead of LTAC or other arrangement.  She has received second dose of remdesivir today.  She denies any discomfort or chest pain or major cough or shortness of breath or chills.  There is no indication of bacterial infection or pneumonia.  Rocephin was discontinued earlier.  Azithromycin was also discontinued today.  I spoke to patient's son over the phone in detail and he decided to take patient home tomorrow.  She might or might not need oxygen on discharge.  By then she has finished the course of 3-day remdesivir.  There is no complication with COVID infection.  CRP is decreasing.  Procalcitonin is at the upper limit of normal.  Systolic blood pressure is high since admission.  Patient stays afebrile.  She was a started on Lopressor.    Data reviewed today: I reviewed all medications, new labs and imaging results over the last 24 hours. I personally reviewed no images or EKG's today.    Physical Exam   Vital Signs: Temp: 97.4  F (36.3  C) Temp src: Oral BP: (!) 181/78 Pulse: 90   Resp: 20 SpO2: 93 % O2 Device: Nasal cannula Oxygen Delivery: 4 LPM  Weight: 109 lbs 12.63 oz  Patient is an elderly lady who is hearing  impaired and in no acute distress with nasal cannula oxygen at low rate.  HEENT is unremarkable with moist oral mucosal moisture, NC in place  Neck is supple  Heart with regular rate and rhythm and no murmur  Lungs with coarse rales bilaterally and fair air movement.  No wheezing or rhonchi  Abdomen is soft and benign and nontender without organomegaly or mass  Extremities without edema or cyanosis or clubbing  Neurological patient is alert and oriented for age and is reasonable with appropriate response.  There is no focal neurological deficit.    Data   Recent Labs   Lab 10/20/22  0411 10/19/22  0210 10/18/22  1206 10/18/22  1105   WBC 9.4 6.6  --  9.4   HGB 14.5 14.4  --  14.6   MCV 86 87  --  88    141*  --  186   INR  --  1.07  --   --     137 139  --    POTASSIUM 3.5 3.9 4.0  --    CHLORIDE 101 99 103  --    CO2 25 26 26  --    BUN 22.3 9.7 15  --    CR 0.57 0.51 0.70  --    ANIONGAP 13 12 10  --    EDILBERTO 8.9 9.0 9.0  --    * 141* 129*  --    ALBUMIN  --  3.7  --   --    PROTTOTAL  --  7.0  --   --    BILITOTAL  --  0.3  --   --    ALKPHOS  --  100  --   --    ALT  --  35  --   --    AST  --  45*  --   --      No results found for this or any previous visit (from the past 24 hour(s)).  Medications     - MEDICATION INSTRUCTIONS -         remdesivir  100 mg Intravenous Q24H    And     sodium chloride 0.9%  50 mL Intravenous Q24H     dexamethasone  6 mg Oral Daily     enoxaparin ANTICOAGULANT  40 mg Subcutaneous Q24H     metoprolol tartrate  25 mg Oral BID     sodium chloride (PF)  3 mL Intracatheter Q8H

## 2022-10-21 VITALS
HEART RATE: 48 BPM | BODY MASS INDEX: 21.44 KG/M2 | WEIGHT: 109.79 LBS | OXYGEN SATURATION: 96 % | RESPIRATION RATE: 22 BRPM | TEMPERATURE: 97.2 F | SYSTOLIC BLOOD PRESSURE: 162 MMHG | DIASTOLIC BLOOD PRESSURE: 84 MMHG

## 2022-10-21 LAB
ANION GAP SERPL CALCULATED.3IONS-SCNC: 10 MMOL/L (ref 7–15)
BUN SERPL-MCNC: 23.6 MG/DL (ref 8–23)
CALCIUM SERPL-MCNC: 9.1 MG/DL (ref 8.2–9.6)
CHLORIDE SERPL-SCNC: 102 MMOL/L (ref 98–107)
CREAT SERPL-MCNC: 0.42 MG/DL (ref 0.51–0.95)
CRP SERPL-MCNC: 11.58 MG/L
D DIMER PPP FEU-MCNC: 1.02 UG/ML FEU (ref 0–0.5)
DEPRECATED HCO3 PLAS-SCNC: 26 MMOL/L (ref 22–29)
ERYTHROCYTE [DISTWIDTH] IN BLOOD BY AUTOMATED COUNT: 13.4 % (ref 10–15)
GFR SERPL CREATININE-BSD FRML MDRD: 87 ML/MIN/1.73M2
GLUCOSE SERPL-MCNC: 163 MG/DL (ref 70–99)
HCT VFR BLD AUTO: 46 % (ref 35–47)
HGB BLD-MCNC: 15.4 G/DL (ref 11.7–15.7)
MCH RBC QN AUTO: 28.3 PG (ref 26.5–33)
MCHC RBC AUTO-ENTMCNC: 33.5 G/DL (ref 31.5–36.5)
MCV RBC AUTO: 85 FL (ref 78–100)
PLATELET # BLD AUTO: 172 10E3/UL (ref 150–450)
POTASSIUM SERPL-SCNC: 3.6 MMOL/L (ref 3.4–5.3)
RBC # BLD AUTO: 5.44 10E6/UL (ref 3.8–5.2)
SODIUM SERPL-SCNC: 138 MMOL/L (ref 136–145)
WBC # BLD AUTO: 16.4 10E3/UL (ref 4–11)

## 2022-10-21 PROCEDURE — 250N000011 HC RX IP 250 OP 636: Performed by: INTERNAL MEDICINE

## 2022-10-21 PROCEDURE — 85379 FIBRIN DEGRADATION QUANT: CPT | Performed by: INTERNAL MEDICINE

## 2022-10-21 PROCEDURE — 80048 BASIC METABOLIC PNL TOTAL CA: CPT | Performed by: INTERNAL MEDICINE

## 2022-10-21 PROCEDURE — 86140 C-REACTIVE PROTEIN: CPT | Performed by: INTERNAL MEDICINE

## 2022-10-21 PROCEDURE — 999N000156 HC STATISTIC RCP CONSULT EA 30 MIN

## 2022-10-21 PROCEDURE — 250N000013 HC RX MED GY IP 250 OP 250 PS 637: Performed by: HOSPITALIST

## 2022-10-21 PROCEDURE — 99239 HOSP IP/OBS DSCHRG MGMT >30: CPT | Performed by: HOSPITALIST

## 2022-10-21 PROCEDURE — 258N000003 HC RX IP 258 OP 636: Performed by: INTERNAL MEDICINE

## 2022-10-21 PROCEDURE — 85027 COMPLETE CBC AUTOMATED: CPT | Performed by: INTERNAL MEDICINE

## 2022-10-21 PROCEDURE — 36415 COLL VENOUS BLD VENIPUNCTURE: CPT | Performed by: INTERNAL MEDICINE

## 2022-10-21 RX ORDER — DEXAMETHASONE 6 MG/1
6 TABLET ORAL DAILY
Qty: 3 TABLET | Refills: 0 | Status: SHIPPED | OUTPATIENT
Start: 2022-10-22 | End: 2022-10-25

## 2022-10-21 RX ORDER — METOPROLOL TARTRATE 25 MG/1
25 TABLET, FILM COATED ORAL 2 TIMES DAILY
Qty: 60 TABLET | Refills: 0 | Status: SHIPPED | OUTPATIENT
Start: 2022-10-21

## 2022-10-21 RX ADMIN — REMDESIVIR 100 MG: 100 INJECTION, POWDER, LYOPHILIZED, FOR SOLUTION INTRAVENOUS at 04:59

## 2022-10-21 RX ADMIN — DEXAMETHASONE 6 MG: 2 TABLET ORAL at 01:02

## 2022-10-21 RX ADMIN — ENOXAPARIN SODIUM 40 MG: 100 INJECTION SUBCUTANEOUS at 04:28

## 2022-10-21 RX ADMIN — SODIUM CHLORIDE 50 ML: 9 INJECTION, SOLUTION INTRAVENOUS at 05:00

## 2022-10-21 RX ADMIN — METOPROLOL TARTRATE 25 MG: 25 TABLET, FILM COATED ORAL at 08:02

## 2022-10-21 ASSESSMENT — ACTIVITIES OF DAILY LIVING (ADL)
ADLS_ACUITY_SCORE: 42
ADLS_ACUITY_SCORE: 36
ADLS_ACUITY_SCORE: 37
DEPENDENT_IADLS:: CLEANING;COOKING;LAUNDRY;SHOPPING;MEAL PREPARATION;MEDICATION MANAGEMENT;MONEY MANAGEMENT;TRANSPORTATION
ADLS_ACUITY_SCORE: 36
ADLS_ACUITY_SCORE: 37

## 2022-10-21 NOTE — DISCHARGE SUMMARY
[facility]  Hospitalist Discharge Summary      Date of Admission:  10/19/2022  Date of Discharge:  10/21/2022  Discharging Provider: Beti Hercules MD  Discharge Service: Hospitalist Service    Discharge Diagnoses   COVID Pneumonia  Elevated blood pressure    Follow-ups Needed After Discharge   Follow-up Appointments     Follow-up and recommended labs and tests       Follow up with primary care provider, Alyssa Dwyer, within 7 days   for hospital follow- up.  The following labs/tests are recommended: CBC,   CMP and checking BP.             Unresulted Labs Ordered in the Past 30 Days of this Admission     No orders found from 9/19/2022 to 10/20/2022.          Discharge Disposition   Discharged to home  Condition at discharge: Stable      Hospital Course   Admission note:  Ms. Sorensen is a 89 year old  female with no significant past medical history who was transferred to [facility] from [facility] ER following a syncopal episode and cardiac arrest. Please note that the patient is hearing impaired and a poor historian and the majority of the history was obtained from the chart. She lives in [place] and she is in town visiting her daughter. She had previously tested positive for COVID-19 and she has a prescription for paxlovid. According to the records she had a syncopal episode at her daughter's home and she was incontinent of bowel. Her daughter called 911 and she did not have a pulse on EMS arrival. Chest compressions were initiated with ROSC and she was transported to [facility] ER for further evaluation. On arrival to the ER she was hypertensive with a blood pressure of 228/102 and hypoxic with O2 saturations in the 80s and her initial labwork was remarkable for a troponin of 41, glucose of 141, d-dimer of 1.42, and glucose of 141. Her CXR showed mild interstitial prominence with small pleural effusions and CT of the head revealed no acute intracranial abnormality.  CTA of the chest demonstrated no acute pulmonary embolism or secondary signs of right heart strain, bibasilar linear and patchy airspace opacities, a severe T11 vertebral body compression fracture, and an incidental 1.8 cm rim-enhancing suprasternal nodule. She complains of a dry cough and intermittent chest pain and she was placed on 4 liters nasal cannula in the ER, 10/20/2022 on 1 L NC O2. She is otherwise in stable condition and she has no other complaints today.    Interval history:  Patient stayed generally afebrile and stable and improved.  She was on 1 L of nasal cannula oxygen mostly for comfort and did not need home O2 as per Resp therapy evaluation. Family liked to take her home instead of LTAC or other arrangement.  She has received 3rd dose of remdesivir today.  She denies any discomfort or chest pain or major cough or shortness of breath or chills.  There is no indication of bacterial infection or pneumonia.  Rocephin was discontinued earlier.  Azithromycin was also discontinued today.  I spoke to patient's son over the phone in detail and he decided to take patient home today.  She does not need oxygen on discharge.  She finished the course of 3-day remdesivir.  There is no complication with COVID infection.  CRP is decreasing.  Procalcitonin is at the upper limit of normal and not elevated.   Systolic blood pressure is high since admission and was started on betablocker Lopressor 25 mg bid and was discharged on same med and Decadron 6 mg every day x 3 more days. Patient stayed afebrile.           Consultations This Hospital Stay   SPIRITUAL HEALTH SERVICES IP CONSULT  CARE MANAGEMENT / SOCIAL WORK IP CONSULT    Code Status   No CPR- Do NOT Intubate    Time Spent on this Encounter   IBeti MD, personally saw the patient today and spent greater than 30 minutes discharging this patient.       Beti Hercules MD  [facility] SURGICAL  [address]  [address]  Phone: [phone]  Fax: [phone]  ______________________________________________________________________    Physical Exam   Vital Signs: Temp: 97.2  F (36.2  C) Temp src: Oral BP: (!) 162/84 Pulse: (!) 48 (sleeping)   Resp: 22 SpO2: 96 % O2 Device: Nasal cannula Oxygen Delivery: 3 LPM  Weight: 109 lbs 12.63 oz  Patient is an elderly lady who is hearing impaired and in no acute distress with nasal cannula oxygen at low rate.  HEENT is unremarkable with moist oral mucosal moisture, NC in place  Neck is supple  Heart with regular rate and rhythm and no murmur  Lungs with coarse rales bilaterally and fair air movement.  No wheezing or rhonchi  Abdomen is soft and benign and nontender without organomegaly or mass  Extremities without edema or cyanosis or clubbing  Neurological patient is alert and oriented for age and is reasonable with appropriate response.  There is no focal neurological deficit.       Primary Care Physician   Alyssa Dwyer    Discharge Orders      Home Care Referral      Reason for your hospital stay    COVID pneumonia     Follow-up and recommended labs and tests     Follow up with primary care provider, Alyssa Dwyer, within 7 days for hospital follow- up.  The following labs/tests are recommended: CBC, CMP and checking BP.     Activity    Your activity upon discharge: activity as tolerated     Diet    Follow this diet upon discharge: Orders Placed This Encounter      Snacks/Supplements Adult: Ensure Enlive; Between Meals      Combination Diet Regular Diet Adult       Significant Results and Procedures   Most Recent 3 CBC's:Recent Labs   Lab Test 10/21/22  0420 10/20/22  0411 10/19/22  0210   WBC 16.4* 9.4 6.6   HGB 15.4 14.5 14.4   MCV 85 86 87    164 141*     Most Recent 3 BMP's:Recent Labs   Lab Test 10/21/22  0420 10/20/22  0411 10/19/22  0210    139 137   POTASSIUM 3.6 3.5 3.9   CHLORIDE 102 101 99   CO2 26 25 26   BUN 23.6* 22.3 9.7   CR 0.42* 0.57 0.51    ANIONGAP 10 13 12   EDILBERTO 9.1 8.9 9.0   * 134* 141*     Most Recent 2 LFT's:Recent Labs   Lab Test 10/19/22  0210   AST 45*   ALT 35   ALKPHOS 100   BILITOTAL 0.3     Most Recent ESR & CRP:Recent Labs   Lab Test 10/21/22  0420   CRP 11.58*       Discharge Medications   Current Discharge Medication List      START taking these medications    Details   dexamethasone (DECADRON) 6 MG tablet Take 1 tablet (6 mg) by mouth daily for 3 days  Qty: 3 tablet, Refills: 0    Associated Diagnoses: Pneumonia due to COVID-19 virus      metoprolol tartrate (LOPRESSOR) 25 MG tablet Take 1 tablet (25 mg) by mouth 2 times daily  Qty: 60 tablet, Refills: 0    Associated Diagnoses: Hypertension, unspecified type         CONTINUE these medications which have NOT CHANGED    Details   guaiFENesin (MUCINEX) 600 MG 12 hr tablet Take 600 mg by mouth 2 times daily as needed for congestion      Multiple Vitamins-Minerals (PRESERVISION AREDS) CAPS Take 1 capsule by mouth 2 times daily         STOP taking these medications       nirmatrelvir and ritonavir (PAXLOVID, 300/100,) therapy pack Comments:   Reason for Stopping:         Pseudoephedrine-DM-GG-APAP (DAYQUIL LIQUICAPS OR) Comments:   Reason for Stopping:             Allergies   No Known Allergies

## 2022-10-21 NOTE — CONSULTS
Care Management Initial Consult    General Information  Assessment completed with: Children, son-Tomas  Type of CM/SW Visit: Initial Assessment    Primary Care Provider verified and updated as needed: No   Readmission within the last 30 days:    none  Advance Care Planning:    None      Communication Assessment  Patient's communication style: spoken language (English or Bilingual)    Hearing Difficulty or Deaf: yes   Wear Glasses or Blind: no    Cognitive  Cognitive/Neuro/Behavioral: WDL                      Living Environment:   People in home: child(ruby), adult (Pt just moved to [place] from [place] & will be living with adult daughterAlyssia in [place])     Current living Arrangements: house      Able to return to prior arrangements: yes     Family/Social Support:  Care provided by: child(ruby)  Provides care for: no one  Marital Status:   Children          Description of Support System: Supportive, Involved    Support Assessment: Adequate family and caregiver support, Adequate social supports    Current Resources:   Patient receiving home care services: No  Community Resources: None  Equipment currently used at home: walker, rolling  Supplies currently used at home: None    Employment/Financial:  Employment Status:          Financial Concerns: insurance, none   Referral to Financial Worker: No    Lifestyle & Psychosocial Needs:  Social Determinants of Health     Tobacco Use: Not on file   Alcohol Use: Not on file   Financial Resource Strain: Not on file   Food Insecurity: Not on file   Transportation Needs: Not on file   Physical Activity: Not on file   Stress: Not on file   Social Connections: Not on file   Intimate Partner Violence: Not on file   Depression: Not on file   Housing Stability: Not on file       Functional Status:  Prior to admission patient needed assistance:   Dependent ADLs:: Ambulation-walker  Dependent IADLs:: Cleaning, Cooking, Laundry, Shopping, Meal Preparation, Medication  Management, Money Management, Transportation     Mental Health Status:  Mental Health Status: No Current Concerns       Chemical Dependency Status:  Chemical Dependency Status: No Current Concerns           Values/Beliefs:  Spiritual, Cultural Beliefs, Tenriism Practices, Values that affect care: no             Additional Information:  SW assisting with discharge planning.  Pt recently moved from [place], [place] to her daughter's home in [place], [place].    As pt is very [place] and Covid+, this writer completed assessment with sonTomas & daughter, Alyssia.  Both state pt will return to Alyssia's home & remain living there.  Alyssia states she has much family support within 5 miles of her home to assist with pt, as needed.    Pt will discharge on new oxygen.  Discussed home care & SW sent referral to [facility]  Unfortunately, pt's insurance does not pay for home care in the state of [place] as it [place] a [place].  KIMBERLEE informed Alyssia of this and also educated on how she can change policies during open enrollment time of  11/1-1/15, see her PCP and request new home care orders.    Pt to discharge to home with family today once oxygen is delivered.    RN to call [facility] masters, once oxygen delivery time is known.  [phone].        RAFFI Rush

## 2022-10-21 NOTE — PLAN OF CARE
Pt remains on oxygen, varies betwee 1-3L/nc to maintain sats 90%, Lungs sound diminished. Pt has dry nonproductive intermittent cough. Tele sinus niranjan 60s. Pt has been a little more confused tonight. Pulled both IVs out of L arm. New one placed this am prior to Remdesivir infusion. Pt denies back pain tonight.

## 2022-10-21 NOTE — PROGRESS NOTES
Resting SpO2 on RA =90%  Exertional SpO2 on RA=94%  Patient has an appropriate cardiac rate increase to maintain SpO2 and a recovery time of 20 seconds to HR of 58.

## 2022-10-21 NOTE — PROGRESS NOTES
[facility] DISCHARGE NOTE    Patient discharged to home at 4:23 PM via wheel chair. Accompanied by son and staff. Discharge instructions reviewed with patient and son, opportunity offered to ask questions. Prescriptions sent to patients preferred pharmacy. All belongings sent with patient.    Madelin Washburn RN

## 2022-10-22 ENCOUNTER — PATIENT OUTREACH (OUTPATIENT)
Dept: CARE COORDINATION | Facility: CLINIC | Age: 87
End: 2022-10-22

## 2022-10-22 NOTE — PROGRESS NOTES
Clinic Care Coordination Contact  [facility]: Post-Discharge Note  SITUATION                                                      Admission:    Admission Date: 10/19/22   Reason for Admission: Pneumonia due to COVID-19 virus  Discharge:   Discharge Date: 10/21/22  Discharge Diagnosis: COVID-19 Pneumonia, Elevated blood pressure    BACKGROUND                                                      Per hospital discharge summary and inpatient provider notes:    Ms. Sorensen is a 89 year old  female with no significant past medical history who was transferred to [facility] from [facility] following a syncopal episode and cardiac arrest. Please note that the patient is hearing impaired and a poor historian and the majority of the history was obtained from the chart. She lives in [place] and she is in town visiting her daughter. She had previously tested positive for COVID-19 and she has a prescription for paxlovid. According to the records she had a syncopal episode at her daughter's home and she was incontinent of bowel. Her daughter called 911 and she did not have a pulse on EMS arrival. Chest compressions were initiated with ROSC and she was transported to [facility] ER for further evaluation. On arrival to the ER she was hypertensive with a blood pressure of 228/102 and hypoxic with O2 saturations in the 80s and her initial labwork was remarkable for a troponin of 41, glucose of 141, d-dimer of 1.42, and glucose of 141. Her CXR showed mild interstitial prominence with small pleural effusions and CT of the head revealed no acute intracranial abnormality. CTA of the chest demonstrated no acute pulmonary embolism or secondary signs of right heart strain, bibasilar linear and patchy airspace opacities, a severe T11 vertebral body compression fracture, and an incidental 1.8 cm rim-enhancing suprasternal nodule. She complains of a dry cough and intermittent chest pain and she was placed on 4 liters  "nasal cannula in the ER. She is otherwise in stable condition and she has no other complaints today.    ASSESSMENT      CHW spoke with pt's daughter with verbal consent from patient.    Discharge Assessment  How are you doing now that you are home?: \"She's okay.. she's having a lot of back pain and I'm not sure why\" (CHW provided nurse triage phone number if she has further questions and would like to speak with a nurse. She declined connecting with a nurse right now.)  How are your symptoms? (Red Flag symptoms escalate to triage hotline per guidelines): Improved  Do you feel your condition is stable enough to be safe at home until your provider visit?: Yes  Does the patient have their discharge instructions? : Yes  Does the patient have questions regarding their discharge instructions? : No  Were you started on any new medications or were there changes to any of your previous medications? : Yes  Does the patient have all of their medications?: Yes  Do you have questions regarding any of your medications? : No  Do you have all of your needed medical supplies or equipment (DME)?  (i.e. oxygen tank, CPAP, cane, etc.): Yes  Discharge follow-up appointment scheduled within 14 calendar days? : No  Is patient agreeable to assistance with scheduling? : No (Pt's daughter said she will call to schedule a follow up appointment for pt on Monday and has the phone number for the clinic to call.)    Post-op (CHW CTA Only)  If the patient had a surgery or procedure, do they have any questions for a nurse?: No    PLAN                                                      Outpatient Plan:      Follow up with primary care provider, Alyssa Dwyer, within 7 days for hospital follow- up.  The following labs/tests are recommended: CBC,  CMP and checking BP.     No future appointments.      For any urgent concerns, please contact our 24 hour nurse triage line: [phone] ([phone])       [facility] " [facility], [facility]  Ph: [phone]

## 2022-11-10 ENCOUNTER — LAB REQUISITION (OUTPATIENT)
Dept: LAB | Facility: CLINIC | Age: 87
End: 2022-11-10

## 2022-11-10 DIAGNOSIS — J18.9 PNEUMONIA, UNSPECIFIED ORGANISM: ICD-10-CM

## 2022-11-10 LAB
ANION GAP SERPL CALCULATED.3IONS-SCNC: 14 MMOL/L (ref 7–15)
BUN SERPL-MCNC: 10.4 MG/DL (ref 8–23)
CALCIUM SERPL-MCNC: 8.6 MG/DL (ref 8.2–9.6)
CHLORIDE SERPL-SCNC: 91 MMOL/L (ref 98–107)
CREAT SERPL-MCNC: 0.43 MG/DL (ref 0.51–0.95)
DEPRECATED HCO3 PLAS-SCNC: 22 MMOL/L (ref 22–29)
GFR SERPL CREATININE-BSD FRML MDRD: 86 ML/MIN/1.73M2
GLUCOSE SERPL-MCNC: 92 MG/DL (ref 70–99)
POTASSIUM SERPL-SCNC: 4.2 MMOL/L (ref 3.4–5.3)
SODIUM SERPL-SCNC: 127 MMOL/L (ref 136–145)

## 2022-11-10 PROCEDURE — 80048 BASIC METABOLIC PNL TOTAL CA: CPT | Performed by: INTERNAL MEDICINE

## 2022-11-14 ENCOUNTER — LAB REQUISITION (OUTPATIENT)
Dept: LAB | Facility: CLINIC | Age: 87
End: 2022-11-14
Payer: COMMERCIAL

## 2022-11-14 DIAGNOSIS — R53.1 WEAKNESS: ICD-10-CM

## 2022-11-14 DIAGNOSIS — J18.9 PNEUMONIA, UNSPECIFIED ORGANISM: ICD-10-CM

## 2022-11-15 LAB
ANION GAP SERPL CALCULATED.3IONS-SCNC: 11 MMOL/L (ref 7–15)
BUN SERPL-MCNC: 13.4 MG/DL (ref 8–23)
CALCIUM SERPL-MCNC: 8.7 MG/DL (ref 8.2–9.6)
CHLORIDE SERPL-SCNC: 97 MMOL/L (ref 98–107)
CREAT SERPL-MCNC: 0.43 MG/DL (ref 0.51–0.95)
DEPRECATED HCO3 PLAS-SCNC: 24 MMOL/L (ref 22–29)
GFR SERPL CREATININE-BSD FRML MDRD: 86 ML/MIN/1.73M2
GLUCOSE SERPL-MCNC: 79 MG/DL (ref 70–99)
POTASSIUM SERPL-SCNC: 4.6 MMOL/L (ref 3.4–5.3)
SODIUM SERPL-SCNC: 132 MMOL/L (ref 136–145)

## 2022-11-15 PROCEDURE — 80048 BASIC METABOLIC PNL TOTAL CA: CPT | Performed by: INTERNAL MEDICINE

## 2022-11-15 PROCEDURE — P9603 ONE-WAY ALLOW PRORATED MILES: HCPCS | Performed by: INTERNAL MEDICINE

## 2022-11-15 PROCEDURE — 36415 COLL VENOUS BLD VENIPUNCTURE: CPT | Performed by: INTERNAL MEDICINE
